# Patient Record
Sex: MALE | URBAN - METROPOLITAN AREA
[De-identification: names, ages, dates, MRNs, and addresses within clinical notes are randomized per-mention and may not be internally consistent; named-entity substitution may affect disease eponyms.]

---

## 2017-02-06 ENCOUNTER — CLINICAL SUPPORT (OUTPATIENT)
Dept: REHABILITATION | Facility: HOSPITAL | Age: 47
End: 2017-02-06
Attending: INTERNAL MEDICINE
Payer: COMMERCIAL

## 2017-02-06 DIAGNOSIS — R52 PAIN: ICD-10-CM

## 2017-02-06 PROCEDURE — G8978 MOBILITY CURRENT STATUS: HCPCS | Mod: CK | Performed by: PHYSICAL THERAPIST

## 2017-02-06 PROCEDURE — 97161 PT EVAL LOW COMPLEX 20 MIN: CPT | Performed by: PHYSICAL THERAPIST

## 2017-02-06 PROCEDURE — G8979 MOBILITY GOAL STATUS: HCPCS | Mod: CK | Performed by: PHYSICAL THERAPIST

## 2017-02-06 NOTE — PROGRESS NOTES
"Physician:Tiffany Elliott MD  Diagnosis:  LBP  Encounter Diagnosis   Name Primary?   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  He c/o LBP and B LE "numbness."  He rates pain as 6/10 presently.  States he's here for pool therapy.      Chief complaint:  pain  Radicular symptoms:  B LE  Aggravating factors:   Trunk flexion  Easing factors:  rest     Current functional status:   ADL limited by pain    Patients structured exercise routine:    none  Exercise routine prior to onset :     Wt-lifting; running    Special tests:    MRI:    Unknown results  Xray:    Unknown results    Past treatment includes:  none    Work:     [employer]                             Pts goals:  Decrease pain with ADL; improve flexibility/strength in core muscles    OBJECTIVE:  Postural examination:  Decreased lordosis in sitting     Functional assessment:   - walking:   independent             AROM:  WNL B LE; trunk flexion decreased approximately 60%, ext 70%, others WNL with pain on flex-ext     MMT:   Gross trunk 4/5 with pain; B LE 4+/5 to 5/5    Tone:  normal    Flexibility testing:   Tight hams B    Special tests:   SLR neg B; neg SI stresses    Palpation:   TTP B L-PVM    Joint mobility: limited    Swelling: none    Other: sensation intact to light touch B LE; 2+ quad reflexes    TREATMENT:    Today's treatment:  HEP and set up for pool therapy    Pt was provided with a written copy of exercises to perform as tolerated, including: knee to chest stretch, PPT, HSS, GS, SL hip abduction, LTR, bridges and prone LE lifts.    Exercises were reviewed and pt was able to demonstrate them prior to the end of the session.     Pt was provided educational information, including: correct posture.    Discussed insurance limitations with pt.     ASSESSMENT:  PT diagnosis: LBP with radiculopathy, " weakness and pain   Patient can benefit from outpatient physical therapy and a home program  Prognosis is Fair.    No cultural, environmental or spiritual barriers identified to treatment or learning.     Medical necessity is demonstrated by the following  IMPAIRMENTS:  Pain limits function of effected part for some activities, Unable to participate fully in daily activities and Weakness    GOALS:    4_   weeks. Pt agrees with goals set.  1. Independent with HEP.  2. Report decreased    LB    pain  <   / =  5/10 with adls such as dressing  3. Increased MMT  for  Trunk/LE from 4+/5 to 5/5 with ADL    4. Increased arom of 10-15% for trunk with ADL     PLAN:  Outpatient physical therapy 2 times weekly to include: pt ed, hep, therapeutic exercises, neuromuscular re-education/ balance exercises, joint mobilizations, modalities prn, and aquatic therapy.    Cont PT for  6         weeks.   I certify the need for these services   furnished under this plan of treatment and while under my   care.____________________________________ Physician/Referring Practitioner Date   of Signature

## 2017-02-16 ENCOUNTER — CLINICAL SUPPORT (OUTPATIENT)
Dept: REHABILITATION | Facility: HOSPITAL | Age: 47
End: 2017-02-16
Attending: INTERNAL MEDICINE
Payer: COMMERCIAL

## 2017-02-16 DIAGNOSIS — R52 PAIN: ICD-10-CM

## 2017-02-16 PROCEDURE — 97113 AQUATIC THERAPY/EXERCISES: CPT

## 2021-04-12 NOTE — PROGRESS NOTES
"Total treatment time: 55 mins    Time In: 5:15  Time Out: 6:10      Subjective  Christopher states "I am medicated right now so my pain level is at 5/10 in the lower back."      Objective    Treatment: Christopher was instructed in and performed therapeutic exercises to develop strength, endurance, ROM, flexibility, posture and core stabilization for 55 minutes. Patient performed therapeutic exercises consisting of: 1:1 w/ PTA x 45 mins    Stretches  HSS 3x20"  Quad str 3x20"     Lower extremity aquatic therex: 20 reps each 0#   -Mini squats with quad set   -Heel raise with glut set   -Hip FLX/LAQ  -Hip ABD/ADD  -HS Curls  -Hip Ext  -Lunges in // bars (each leg in front) NP     Endurance:  Marching in place x 3 mins     Core: NP  Double knees to chest in // bars in supine position 2 x 10  Mini squat w/ dumb bells in front of body held under water 6 x 10" holds        Patient was not issued HEP for pool.      Assessment  Patient's tolerance to treatment was good. Pt could not perform HS curls/ HIP extensions due to pain so exercise was broken up into two separate parts w/  hip extension in a non painful ROM.  Pt needed visual and verbal cueing from PTA for proper form and core stabilization.  Progress as tolerated.  Patient will continue to benefit from skilled PT intervention.    Christopher is making good progress towards established goals.      Plan  Continue 2x per week.    "
2